# Patient Record
Sex: FEMALE | Race: WHITE | Employment: OTHER | URBAN - METROPOLITAN AREA
[De-identification: names, ages, dates, MRNs, and addresses within clinical notes are randomized per-mention and may not be internally consistent; named-entity substitution may affect disease eponyms.]

---

## 2019-01-09 ENCOUNTER — TELEPHONE (OUTPATIENT)
Dept: FAMILY MEDICINE CLINIC | Age: 84
End: 2019-01-09

## 2019-01-09 NOTE — TELEPHONE ENCOUNTER
Received a call from the patients home radha nurse, she stated the patient will be a new patient seeing Dr. Ana Cristina Burgess tomorrow. She stated that the patient has had difficulty with memory and is very confused. She stated the patient had recently been seen elsewhere and tested for a UTI and other things and was determined all was fine. The home health nurse stated she would fax these documents to us. She stated the patient is just oriented enough to make you think she is fine but she wanted to assure us that she is not. She stated the patient was extremely confused yesterday. She stated the patients daughter is trying to live with her until they can determine a living situation for her as they do not feel she is lucid enough to live on her own any more. The patient is going to be accompanied by her son at [address] appointment. The home health nurse stated the patient is in denial about her confusion and becomes very defensive when it is discussed. She stopped taking her Paxil for a while but was restarted after she was seen for medical care recently.  The home health nurse can be reached at [phone]

## 2019-01-10 ENCOUNTER — OFFICE VISIT (OUTPATIENT)
Dept: FAMILY MEDICINE CLINIC | Age: 84
End: 2019-01-10

## 2019-01-10 VITALS — RESPIRATION RATE: 16 BRPM | WEIGHT: 142 LBS | HEIGHT: 64 IN | BODY MASS INDEX: 24.24 KG/M2

## 2019-01-10 DIAGNOSIS — I48.0 PAROXYSMAL ATRIAL FIBRILLATION (HCC): ICD-10-CM

## 2019-01-10 DIAGNOSIS — R45.89 SUICIDAL BEHAVIOR WITHOUT ATTEMPTED SELF-INJURY: ICD-10-CM

## 2019-01-10 DIAGNOSIS — R45.850 HOMICIDAL THOUGHTS: ICD-10-CM

## 2019-01-10 DIAGNOSIS — F03.91 DEMENTIA WITH BEHAVIORAL DISTURBANCE, UNSPECIFIED DEMENTIA TYPE: Primary | ICD-10-CM

## 2019-01-10 RX ORDER — METOPROLOL SUCCINATE 25 MG/1
25 TABLET, EXTENDED RELEASE ORAL DAILY
COMMUNITY
Start: 2019-01-03

## 2019-01-10 RX ORDER — MEMANTINE HYDROCHLORIDE 5 MG/1
5 TABLET ORAL 2 TIMES DAILY
Qty: 180 TAB | Refills: 1 | Status: SHIPPED | OUTPATIENT
Start: 2019-01-10

## 2019-01-10 RX ORDER — LANOLIN ALCOHOL/MO/W.PET/CERES
CREAM (GRAM) TOPICAL
COMMUNITY

## 2019-01-10 RX ORDER — MEMANTINE HYDROCHLORIDE 5 MG/1
5 TABLET ORAL 2 TIMES DAILY
COMMUNITY
Start: 2019-01-07 | End: 2019-01-10 | Stop reason: SDUPTHER

## 2019-01-10 RX ORDER — DABIGATRAN ETEXILATE MESYLATE 150 MG/1
150 CAPSULE ORAL 2 TIMES DAILY
COMMUNITY
Start: 2018-12-24 | End: 2019-01-10 | Stop reason: SDUPTHER

## 2019-01-10 RX ORDER — PAROXETINE HYDROCHLORIDE 20 MG/1
TABLET, FILM COATED ORAL
COMMUNITY
Start: 2019-01-07 | End: 2019-01-10 | Stop reason: SDUPTHER

## 2019-01-10 RX ORDER — PAROXETINE HYDROCHLORIDE 20 MG/1
20 TABLET, FILM COATED ORAL DAILY
Qty: 90 TAB | Refills: 1 | Status: SHIPPED | OUTPATIENT
Start: 2019-01-10

## 2019-01-10 RX ORDER — DABIGATRAN ETEXILATE MESYLATE 150 MG/1
150 CAPSULE ORAL 2 TIMES DAILY
Qty: 180 CAP | Refills: 1 | Status: SHIPPED | OUTPATIENT
Start: 2019-01-10

## 2019-01-10 NOTE — PROGRESS NOTES
Identified pt with two pt identifiers(name and ). Chief Complaint   Patient presents with    Altered mental status     confusion,    Medication Refill    Form Completion     POA        Health Maintenance Due   Topic    DTaP/Tdap/Td series (1 - Tdap)    Shingrix Vaccine Age 50> (1 of 2)    GLAUCOMA SCREENING Q2Y     Bone Densitometry (Dexa) Screening     Pneumococcal 65+ Low/Medium Risk (1 of 2 - PCV13)    MEDICARE YEARLY EXAM     Influenza Age 5 to Adult        Wt Readings from Last 3 Encounters:   01/10/19 142 lb (64.4 kg)   10/30/14 140 lb (63.5 kg)     Temp Readings from Last 3 Encounters:   10/30/14 97.7 °F (36.5 °C)     BP Readings from Last 3 Encounters:   10/30/14 128/73     Pulse Readings from Last 3 Encounters:   10/30/14 76         Learning Assessment:  :     No flowsheet data found. Depression Screening:  :     No flowsheet data found. Fall Risk Assessment:  :     No flowsheet data found. Abuse Screening:  :     No flowsheet data found. Coordination of Care Questionnaire:  :     1) Have you been to an emergency room, urgent care clinic since your last visit? yes Jose Singer, Wants to kill daughter and self and had plan to do this so she was admitted. Hospitalized since your last visit? yes    Precious Quesada, suicidal         2) Have you seen or consulted any other health care providers outside of [address] [place] [address] since your last visit? no  (Include any pap smears or colon screenings in this section.)    3) Do you have an Advance Directive on file? no  Are you interested in receiving information about Advance Directives? no    Patient is accompanied by son and daughter in law I have received verbal consent from [address] [employer] to discuss any/all medical information while they are present in the room. Reviewed record in preparation for visit and have obtained necessary documentation. Medication reconciliation up to date and corrected with patient at this time.

## 2019-01-14 ENCOUNTER — TELEPHONE (OUTPATIENT)
Dept: FAMILY MEDICINE CLINIC | Age: 84
End: 2019-01-14

## 2019-01-14 NOTE — TELEPHONE ENCOUNTER
Spoke with Dr. Mik Hodgson after determining what exactly the daughter in law was requesting. She stated they are seeing their  to get guardianship of the patient due to her incapacitated mental state and she was wondering if Dr. Mik Hodgson would be willing to sign forms that she is unable to care for herself. After speaking with Dr. Mik Hodgson I called her back and advised Dr. Mik Hodgson thinks it would be best for them to get these forms signed from her doctors at [facility], as they are evaluating her mental status there in the hospital. She stated she has been having difficulty there and that the patient has not even seen a doctor at [facility] yet. I advised she should advocate and push for a doctor to see her and to ensure she is being cared for correctly. She stated she would do this.

## 2019-01-14 NOTE — TELEPHONE ENCOUNTER
Patients daughter is requesting to have a call back from Dr. Margaret Hugo or nurse. She is requesting paperwork supporting their request for guardianship states that they have an appointment with a  for this Thursday.

## 2019-01-23 ENCOUNTER — DOCUMENTATION ONLY (OUTPATIENT)
Dept: FAMILY MEDICINE CLINIC | Age: 84
End: 2019-01-23

## 2019-01-23 NOTE — PROGRESS NOTES
Faxed over Plan of Care signed by Dr. Darnell Jacinto to [facility] at [phone]. Original documents placed with documents to be scanned into the patient's chart.

## 2019-01-28 ENCOUNTER — TELEPHONE (OUTPATIENT)
Dept: FAMILY MEDICINE CLINIC | Age: 84
End: 2019-01-28

## 2019-01-28 NOTE — TELEPHONE ENCOUNTER
Patient's son called regarding visit patient had on 01/10/19. Doctor Daniel Chacon patient be taken to the hospital on this day and she was, Since appt patient has been in [facility]. They won't evaluate her stating that we are meant to evaluate her and he says we are stating they are meant to evaluate her. Can Dr. Eveline Rangel contact doctors at [facility] to inform them either way as to whether we are or aren't able to provide her care.

## 2019-01-29 NOTE — TELEPHONE ENCOUNTER
Called patient's emergency contact to clarify what Virginia Pizarro was looking for and she stated that they were looking for the PCP office to sign a paper stating Incompetency and that she couldn't care for herself as they do not do that there. This was so they could get her into an assisted living facility to get her more appropriate care. They stated she has now agreed to sign the form herself and the daughter is now filling out the paperwork for the assisted living facility so nothing more is required of us at this time.

## 2019-01-29 NOTE — TELEPHONE ENCOUNTER
Patient was seen in our clinic and expressed suicidal and homicidal ideations. She was instructed to go to [facility] for further evaluation and treatment. Our clinic is not best suited for handling Geriatric Psychiatry and that the providers at [facility] should be managing patient care or providing the family resources for Geriatric Psychiatry.      Issac Harris, DO

## 2020-10-16 NOTE — PROGRESS NOTES
Fernando Williamson is a 80 y.o. female who had concerns including Altered mental status (confusion,); Medication Refill (pradaxa, memantine, paroxetine hcl); and Form Completion (POA). Patient presents today for AMS, agitation, and suicidal/homicidal thoughts. Patient accompanied by Son and Daughter in-law. Patient has had worsening mentation of the last couple of months. She was recently admitted to [facility] for suicidal ideations. She was admitted for about 2 weeks. She has had an extensive workup including CT that was negative. At that time patient stated that she had a plan to kill herself. She had a plan to  her car off a bridge. Patient states that she wants to kill everyone, including the daughter who is currently living with her. She has not bathed recently. She does not take her medication without help. Per daugther in-law she was having auditory and visual hallucination. Personality changes are new following the recent fall. She states that \"she wishes she was better of dead\" and that she \"wants to die\" , she \"hates everyone\". She does not want current daugther to live with her. She is currently taking Namenda and Prozac. She will likely need inpatient geriatric psych or long term care facility. Of note, she has a previous diagnosis of depression and is currently being treated with Paxil. She had been off of the medication for a short period, but she is back taking it following discharge from [facility]. History of pacer. Currently on Pradaxa. She did fall recently, but CT was negative for bleed. Well controlled on current medication.        ROS: (positive in bold)  General: wt loss, fever, chills, fatigue   Cardiac: chest pain, palpitations  Pul: SOB, dyspnea  GI: abdominal pain, N&V, diarrhea, constipation   : hematuria, dysuria, freq, urgency, incontinence   Neuro: weakness, parasthesias, headache, lightheaded, dizziness, Confusion  Psych: anxiety, depression, suicidal/homicidal Writer contacted patient regarding address on her forms.    Patient stated she is pretty confident that her address was not on her forms. Informed patient if address was on forms - there is nothing we can do.    Advised to call back in 7-14 days to check on status of paperwork.    Patient verbalized understanding and very appreciative of call.     thoughts with plan. Past Medical History:  Past Medical History:   Diagnosis Date    Anxiety     Atrial fibrillation ([facility] [address]     paroxymal - dr. Po Delgado    Intracranial bleed ([facility] [address] 08/2015    Major depression     Memory loss     Migraines        Past Surgical History:  Past Surgical History:   Procedure Laterality Date    HX ORTHOPAEDIC      L wrist pin    HX PACEMAKER PLACEMENT      HX TONSILLECTOMY         Family History:  Family History   Problem Relation Age of Onset    Diabetes Child     Thyroid Disease Child         hypo    Other Child         hemochromatosis       Allergies:  No Known Allergies    Social History:  Social History     Tobacco Use    Smoking status: Never Smoker    Smokeless tobacco: Never Used   Substance Use Topics    Alcohol use: No    Drug use: No       Current Meds:  Current Outpatient Medications on File Prior to Visit   Medication Sig Dispense Refill    metoprolol succinate (TOPROL-XL) 25 mg XL tablet Take 25 mg by mouth daily.  calcium-cholecalciferol, d3, 500 mg(1,250mg) -400 unit chew Take  by mouth.  multivit-min/iron/folic/lutein (CENTRUM SILVER WOMEN PO) Take  by mouth. No current facility-administered medications on file prior to visit. Visit Vitals  Resp 16   Ht 5' 4\" (1.626 m)   Wt 142 lb (64.4 kg)   BMI 24.37 kg/m²       Gen:  Well developed, well nourished female in no acute distress  HEENT: normocephalic/atraumatic; EOMI  Neuro: Alert  Psych:  Agitated    Assessment/Plan:      ICD-10-CM ICD-9-CM    1. Dementia with behavioral disturbance, unspecified dementia type F03.91 294.21    2. Suicidal behavior without attempted self-injury R46.89 300.9    3. Homicidal thoughts R45.850 V62.85    4. Paroxysmal atrial fibrillation (HCC) I48.0 427.31         Symptoms suggestive of severe dementia with violent agitation. She expresses suicidal thoughts with a plan of driving her car off of a bridge.  She also expressed homicidal thoughts towards multiple family members. She was recently admitted to [facility] for SI. She has a Psyc follow-up on 1/31/19. She may benefit from antipsychotic, however this should be managed by psych Given current state I recommend that family take her back to inpatient Psych, either [facility] or [place],  given SI/HI with expression and a plan. Son and daughter in-law will take patient to [facility]. Called and discussed with ED attending. Namenda and Paxil refilled. AFib  Stable. Currently on Pradaxa. Followed by Dr. Lakshmi Carreno. Pradaxa refilled. I have discussed the diagnosis with the patient and the intended plan as seen in the above orders. The patient has received an after-visit summary and questions were answered concerning future plans. I have discussed medication side effects and warnings with the patient as well. The patient agrees and understands above plan. Follow-up Disposition:  Return if symptoms worsen or fail to improve. Patient discussed with supervising attending.     Chandler Lower, DO